# Patient Record
Sex: MALE | Race: BLACK OR AFRICAN AMERICAN | NOT HISPANIC OR LATINO
[De-identification: names, ages, dates, MRNs, and addresses within clinical notes are randomized per-mention and may not be internally consistent; named-entity substitution may affect disease eponyms.]

---

## 2024-06-06 ENCOUNTER — APPOINTMENT (OUTPATIENT)
Dept: ORTHOPEDIC SURGERY | Facility: CLINIC | Age: 49
End: 2024-06-06
Payer: COMMERCIAL

## 2024-06-06 DIAGNOSIS — Z78.9 OTHER SPECIFIED HEALTH STATUS: ICD-10-CM

## 2024-06-06 DIAGNOSIS — S63.619A UNSPECIFIED SPRAIN OF UNSPECIFIED FINGER, INITIAL ENCOUNTER: ICD-10-CM

## 2024-06-06 DIAGNOSIS — S63.694A OTHER SPRAIN OF RIGHT RING FINGER, INITIAL ENCOUNTER: ICD-10-CM

## 2024-06-06 PROBLEM — Z00.00 ENCOUNTER FOR PREVENTIVE HEALTH EXAMINATION: Status: ACTIVE | Noted: 2024-06-06

## 2024-06-06 PROCEDURE — 73140 X-RAY EXAM OF FINGER(S): CPT | Mod: RT

## 2024-06-06 PROCEDURE — 99203 OFFICE O/P NEW LOW 30 MIN: CPT

## 2024-06-06 NOTE — IMAGING
[de-identified] : Right ring finger Mildly swollen at P1 and PIP No rotational deformity NTTP throughout Able to make a full composite fist + FDS/FDP nvi  3 views right ring finger: No acute fractures or malalignment

## 2024-06-06 NOTE — HISTORY OF PRESENT ILLNESS
[de-identified] : 06/06/2024 FARHANA 48 year M is here for Location: Right ring finger Complaint: on 4/17/24 He was holding onto another person who moved and he felt a pop in his finger and pain throughout his hand. He went to [facility] where xrays were taken that were negative for fracture. He purchased a splint from [facility] that he used for 2 weeks. His main complaint today is swelling in the digit. Denies numbness, tingling, locking, prior injury.  He has been out of work since his injury.  Although he has returned to all of his other personal activities. Onset: 4/17/2024 Prior treatments: Splint Hand Dominance: Right  Occupation:   PMH: Denies  Allergies: Penicillin

## 2024-06-06 NOTE — DISCUSSION/SUMMARY
[de-identified] : - reviewed the nature of this injury and prognosis with the patient in layman's terms - discussed indications for both operative and nonoperative treatment - reviewed options including the role for bracing, anti-inflammatory medications and therapy - reviewed risks, benefits and alternatives to these - patient instructed on finger range of motion exercises - NSAIDs as needed for pain - f/u prn